# Patient Record
Sex: FEMALE | Race: ASIAN | URBAN - METROPOLITAN AREA
[De-identification: names, ages, dates, MRNs, and addresses within clinical notes are randomized per-mention and may not be internally consistent; named-entity substitution may affect disease eponyms.]

---

## 2021-07-08 ENCOUNTER — OUT OF OFFICE VISIT (OUTPATIENT)
Dept: URBAN - METROPOLITAN AREA MEDICAL CENTER 31 | Facility: MEDICAL CENTER | Age: 61
End: 2021-07-08
Payer: COMMERCIAL

## 2021-07-08 DIAGNOSIS — Z87.11 H/O PEPTIC ULCER: ICD-10-CM

## 2021-07-08 DIAGNOSIS — K31.89 ACQUIRED DEFORMITY OF DUODENUM: ICD-10-CM

## 2021-07-08 DIAGNOSIS — K92.0 BLOODY EMESIS: ICD-10-CM

## 2021-07-08 DIAGNOSIS — K30 ACID INDIGESTION: ICD-10-CM

## 2021-07-08 DIAGNOSIS — T18.2XXA FOREIGN BODY IN STOMACH: ICD-10-CM

## 2021-07-08 DIAGNOSIS — I95.9 HYPOTENSION: ICD-10-CM

## 2021-07-08 DIAGNOSIS — I85.01 BLEEDING ESOPHAGEAL VARICES: ICD-10-CM

## 2021-07-08 PROCEDURE — G8427 DOCREV CUR MEDS BY ELIG CLIN: HCPCS | Performed by: INTERNAL MEDICINE

## 2021-07-08 PROCEDURE — 99222 1ST HOSP IP/OBS MODERATE 55: CPT | Performed by: INTERNAL MEDICINE

## 2021-07-08 PROCEDURE — 43247 EGD REMOVE FOREIGN BODY: CPT | Performed by: INTERNAL MEDICINE

## 2021-07-08 PROCEDURE — 43244 EGD VARICES LIGATION: CPT | Performed by: INTERNAL MEDICINE

## 2021-07-09 ENCOUNTER — OUT OF OFFICE VISIT (OUTPATIENT)
Dept: URBAN - METROPOLITAN AREA MEDICAL CENTER 31 | Facility: MEDICAL CENTER | Age: 61
End: 2021-07-09
Payer: COMMERCIAL

## 2021-07-09 DIAGNOSIS — K92.0 BLOODY EMESIS: ICD-10-CM

## 2021-07-09 DIAGNOSIS — K75.81 CHRONIC STEATOHEPATITIS, NONALCOHOLIC: ICD-10-CM

## 2021-07-09 DIAGNOSIS — K74.69 CIRRHOSIS, CRYPTOGENIC: ICD-10-CM

## 2021-07-09 PROCEDURE — 99232 SBSQ HOSP IP/OBS MODERATE 35: CPT | Performed by: INTERNAL MEDICINE

## 2021-07-10 ENCOUNTER — OUT OF OFFICE VISIT (OUTPATIENT)
Dept: URBAN - METROPOLITAN AREA MEDICAL CENTER 31 | Facility: MEDICAL CENTER | Age: 61
End: 2021-07-10
Payer: COMMERCIAL

## 2021-07-10 DIAGNOSIS — R74.8 ABNORMAL ALKALINE PHOSPHATASE TEST: ICD-10-CM

## 2021-07-10 DIAGNOSIS — K92.0 BLOODY EMESIS: ICD-10-CM

## 2021-07-10 DIAGNOSIS — K75.81 CHRONIC STEATOHEPATITIS, NONALCOHOLIC: ICD-10-CM

## 2021-07-10 DIAGNOSIS — R93.3 ABN FINDINGS-GI TRACT: ICD-10-CM

## 2021-07-10 PROCEDURE — 99232 SBSQ HOSP IP/OBS MODERATE 35: CPT | Performed by: INTERNAL MEDICINE

## 2021-09-01 ENCOUNTER — WEB ENCOUNTER (OUTPATIENT)
Dept: URBAN - METROPOLITAN AREA CLINIC 115 | Facility: CLINIC | Age: 61
End: 2021-09-01

## 2021-09-01 ENCOUNTER — LAB OUTSIDE AN ENCOUNTER (OUTPATIENT)
Dept: URBAN - METROPOLITAN AREA CLINIC 115 | Facility: CLINIC | Age: 61
End: 2021-09-01

## 2021-09-01 ENCOUNTER — OFFICE VISIT (OUTPATIENT)
Dept: URBAN - METROPOLITAN AREA CLINIC 115 | Facility: CLINIC | Age: 61
End: 2021-09-01
Payer: COMMERCIAL

## 2021-09-01 DIAGNOSIS — K74.60 CIRRHOSIS OF LIVER WITHOUT ASCITES, UNSPECIFIED HEPATIC CIRRHOSIS TYPE: ICD-10-CM

## 2021-09-01 DIAGNOSIS — D50.0 IRON DEFICIENCY ANEMIA DUE TO CHRONIC BLOOD LOSS: ICD-10-CM

## 2021-09-01 DIAGNOSIS — R60.0 PEDAL EDEMA: ICD-10-CM

## 2021-09-01 DIAGNOSIS — I85.11 SECONDARY ESOPHAGEAL VARICES WITH BLEEDING: ICD-10-CM

## 2021-09-01 PROBLEM — 12063002: Status: ACTIVE | Noted: 2021-09-01

## 2021-09-01 PROCEDURE — 99214 OFFICE O/P EST MOD 30 MIN: CPT | Performed by: INTERNAL MEDICINE

## 2021-09-01 RX ORDER — FUROSEMIDE 20 MG/1
1 TABLET TABLET ORAL EVERY OTHER DAY
Qty: 15 TABLET | Refills: 3 | OUTPATIENT
Start: 2021-09-01

## 2021-09-01 RX ORDER — VITAMIN A 2400 MCG
1 TABLET CAPSULE ORAL ONCE A DAY
Status: ACTIVE | COMMUNITY

## 2021-09-01 RX ORDER — SPIRONOLACTONE 25 MG/1
1 TABLET TABLET, FILM COATED ORAL EVERY OTHER DAY
Qty: 15 TABLET | Refills: 2 | OUTPATIENT
Start: 2021-09-01 | End: 2021-11-29

## 2021-09-01 RX ORDER — PANTOPRAZOLE SODIUM 40 MG/1
1 TABLET TABLET, DELAYED RELEASE ORAL ONCE A DAY
Qty: 90 TABLET | Refills: 1 | OUTPATIENT
Start: 2021-09-01

## 2021-09-01 RX ORDER — HYDROCHLOROTHIAZIDE 12.5 MG/1
1 CAPSULE IN THE MORNING CAPSULE ORAL ONCE A DAY
Status: ACTIVE | COMMUNITY

## 2021-09-01 RX ORDER — PROPRANOLOL HYDROCHLORIDE 10 MG/1
1 TABLET TABLET ORAL ONCE A DAY
Qty: 30 TABLET | Refills: 2 | OUTPATIENT
Start: 2021-09-01

## 2021-09-01 RX ORDER — PROPRANOLOL HYDROCHLORIDE 10 MG/1
10 MG TABLET ORAL ONCE A DAY
Status: ACTIVE | COMMUNITY

## 2021-09-01 NOTE — HPI-TODAY'S VISIT:
Ms. Paniagua was seen today for follow-up.  She is accompanied by her son and .  She was hospitalized recently at the [facility] for her active hematemesis.  Emergency upper endoscopy revealed gastroesophageal varices and portal gastropathy.  She underwent esophageal banding.  She is not known to have liver disease in the past however recently because of her slightly abnormal liver enzymes as well as her fatigue and tiredness and anemia she was advised to see GI.  Patient was recently diagnosed with diabetes.  No family history of liver disease.  She does not drink alcohol she has not had any medications that would cause her liver disease.  She has not had a prior history of GI bleeding.  She reports having pedal edema for which she has seen cardiologist and was started on furosemide and potassium.  Recent scans were reviewed which showed thickening of the stomach and duodenum however there is no obvious peptic ulcer disease it was noticed during the procedure.  She denies any diarrhea.  She has not had a prior colonoscopy evaluation.  She reports her fatigue and tiredness is improving.

## 2021-09-07 LAB
A/G RATIO: 0.9
AAT, DNA ANALYSIS: (no result)
ADDITIONAL INFORMATION:: (no result)
ALBUMIN: 3.5
ALKALINE PHOSPHATASE: 167
ALT (SGPT): 48
AST (SGOT): 75
BASO (ABSOLUTE): 0.1
BASOS: 1
BILIRUBIN, TOTAL: 2.3
BUN/CREATININE RATIO: 11
BUN: 10
CALCIUM: 9.9
CARBON DIOXIDE, TOTAL: 26
CHLORIDE: 97
CREATININE: 0.95
EGFR IF AFRICN AM: 76
EGFR IF NONAFRICN AM: 66
EOS (ABSOLUTE): 0.2
EOS: 4
FERRITIN, SERUM: 108
GLOBULIN, TOTAL: 3.7
GLUCOSE: 242
HEMATOCRIT: 35.1
HEMATOLOGY COMMENTS:: (no result)
HEMOGLOBIN: 12.2
HEP A AB, TOTAL: POSITIVE
HEP B SURFACE AB, QUAL: REACTIVE
IMMATURE CELLS: (no result)
IMMATURE GRANS (ABS): 0
IMMATURE GRANULOCYTES: 0
LYMPHS (ABSOLUTE): 1.9
LYMPHS: 32
Lab: (no result)
MCH: 32.3
MCHC: 34.8
MCV: 93
MONOCYTES(ABSOLUTE): 0.5
MONOCYTES: 9
NEUTROPHILS (ABSOLUTE): 3.1
NEUTROPHILS: 54
NRBC: (no result)
PLATELETS: 125
POTASSIUM: 3.2
PROTEIN, TOTAL: 7.2
RBC: 3.78
RDW: 14.3
SODIUM: 139
WBC: 5.8

## 2021-09-29 ENCOUNTER — OFFICE VISIT (OUTPATIENT)
Dept: URBAN - METROPOLITAN AREA MEDICAL CENTER 31 | Facility: MEDICAL CENTER | Age: 61
End: 2021-09-29
Payer: COMMERCIAL

## 2021-09-29 DIAGNOSIS — K74.69 CRYPTOGENIC CIRRHOSIS: ICD-10-CM

## 2021-09-29 DIAGNOSIS — I85.10 ESOPH VARICE OTHER DIS: ICD-10-CM

## 2021-09-29 PROCEDURE — 43244 EGD VARICES LIGATION: CPT | Performed by: INTERNAL MEDICINE

## 2022-01-21 ENCOUNTER — WEB ENCOUNTER (OUTPATIENT)
Dept: URBAN - METROPOLITAN AREA CLINIC 115 | Facility: CLINIC | Age: 62
End: 2022-01-21

## 2022-01-21 ENCOUNTER — OFFICE VISIT (OUTPATIENT)
Dept: URBAN - METROPOLITAN AREA CLINIC 115 | Facility: CLINIC | Age: 62
End: 2022-01-21
Payer: COMMERCIAL

## 2022-01-21 ENCOUNTER — LAB OUTSIDE AN ENCOUNTER (OUTPATIENT)
Dept: URBAN - METROPOLITAN AREA CLINIC 115 | Facility: CLINIC | Age: 62
End: 2022-01-21

## 2022-01-21 DIAGNOSIS — I85.11 SECONDARY ESOPHAGEAL VARICES WITH BLEEDING: ICD-10-CM

## 2022-01-21 DIAGNOSIS — K74.60 CIRRHOSIS OF LIVER WITHOUT ASCITES, UNSPECIFIED HEPATIC CIRRHOSIS TYPE: ICD-10-CM

## 2022-01-21 DIAGNOSIS — D50.0 IRON DEFICIENCY ANEMIA DUE TO CHRONIC BLOOD LOSS: ICD-10-CM

## 2022-01-21 DIAGNOSIS — R60.0 PEDAL EDEMA: ICD-10-CM

## 2022-01-21 PROBLEM — 724556004: Status: ACTIVE | Noted: 2021-09-01

## 2022-01-21 PROCEDURE — 99214 OFFICE O/P EST MOD 30 MIN: CPT | Performed by: INTERNAL MEDICINE

## 2022-01-21 RX ORDER — PROPRANOLOL HYDROCHLORIDE 10 MG/1
10 MG TABLET ORAL ONCE A DAY
Status: ACTIVE | COMMUNITY

## 2022-01-21 RX ORDER — PANTOPRAZOLE SODIUM 40 MG/1
1 TABLET TABLET, DELAYED RELEASE ORAL ONCE A DAY
Qty: 90 TABLET | Refills: 1 | Status: ACTIVE | COMMUNITY
Start: 2021-09-01

## 2022-01-21 RX ORDER — PROPRANOLOL HYDROCHLORIDE 10 MG/1
1 TABLET TABLET ORAL ONCE A DAY
Qty: 30 TABLET | Refills: 2 | OUTPATIENT

## 2022-01-21 RX ORDER — FUROSEMIDE 20 MG/1
1 TABLET TABLET ORAL EVERY OTHER DAY
Qty: 15 TABLET | Refills: 3 | Status: ACTIVE | COMMUNITY
Start: 2021-09-01

## 2022-01-21 RX ORDER — HYDROCHLOROTHIAZIDE 12.5 MG/1
1 CAPSULE IN THE MORNING CAPSULE ORAL ONCE A DAY
Status: ACTIVE | COMMUNITY

## 2022-01-21 RX ORDER — VITAMIN A 2400 MCG
1 TABLET CAPSULE ORAL ONCE A DAY
Status: ACTIVE | COMMUNITY

## 2022-01-21 RX ORDER — PROPRANOLOL HYDROCHLORIDE 10 MG/1
1 TABLET TABLET ORAL ONCE A DAY
Qty: 30 TABLET | Refills: 2 | Status: ACTIVE | COMMUNITY
Start: 2021-09-01

## 2022-01-21 RX ORDER — PANTOPRAZOLE SODIUM 40 MG/1
1 TABLET TABLET, DELAYED RELEASE ORAL ONCE A DAY
Qty: 90 TABLET | Refills: 1 | OUTPATIENT

## 2022-01-21 NOTE — HPI-TODAY'S VISIT:
Ms. Paniagua was seen today for follow-up.  She is accompanied by her son and .  She was hospitalized recently at the [facility] for her active hematemesis.  Emergency upper endoscopy revealed gastroesophageal varices and portal gastropathy.  She underwent esophageal banding.  She is not known to have liver disease in the past however recently because of her slightly abnormal liver enzymes as well as her fatigue and tiredness and anemia she was advised to see GI.  Patient was recently diagnosed with diabetes.  No family history of liver disease.  She does not drink alcohol she has not had any medications that would cause her liver disease.  She has not had a prior history of GI bleeding.  She reports having pedal edema for which she has seen cardiologist and was started on furosemide and potassium.  Recent scans were reviewed which showed thickening of the stomach and duodenum however there is no obvious peptic ulcer disease it was noticed during the procedure.  She denies any diarrhea.  She has not had a prior colonoscopy evaluation.  She reports her fatigue and tiredness is improving.  1/21/22:

## 2022-01-21 NOTE — HPI-TODAY'S VISIT:
Ms. Paniagua is here for follow-up.  She is accompanied by her son only complains of some fatigue and tiredness and not able to lose weight otherwise it denies any joint pains or arthritis.  No confusions no reports of recent hematemesis or melena.  Last upper endoscopy was in September that required more banding of the esophageal varices.  She is immune to hep A and hep B.  Work-up in the past few for consistent with a nonalcoholic steatosis.

## 2022-01-27 ENCOUNTER — OFFICE VISIT (OUTPATIENT)
Dept: URBAN - METROPOLITAN AREA CLINIC 114 | Facility: CLINIC | Age: 62
End: 2022-01-27
Payer: COMMERCIAL

## 2022-01-27 DIAGNOSIS — K74.69 OTHER CIRRHOSIS OF LIVER: ICD-10-CM

## 2022-01-27 DIAGNOSIS — R18.8 ASCITES: ICD-10-CM

## 2022-01-27 DIAGNOSIS — K80.20 CHOLELITHIASIS: ICD-10-CM

## 2022-01-27 PROCEDURE — 76705 ECHO EXAM OF ABDOMEN: CPT | Performed by: INTERNAL MEDICINE

## 2022-01-27 RX ORDER — VITAMIN A 2400 MCG
1 TABLET CAPSULE ORAL ONCE A DAY
Status: ACTIVE | COMMUNITY

## 2022-01-27 RX ORDER — PROPRANOLOL HYDROCHLORIDE 10 MG/1
1 TABLET TABLET ORAL ONCE A DAY
Qty: 30 TABLET | Refills: 2 | Status: ACTIVE | COMMUNITY

## 2022-01-27 RX ORDER — PROPRANOLOL HYDROCHLORIDE 10 MG/1
10 MG TABLET ORAL ONCE A DAY
Status: ACTIVE | COMMUNITY

## 2022-01-27 RX ORDER — PANTOPRAZOLE SODIUM 40 MG/1
1 TABLET TABLET, DELAYED RELEASE ORAL ONCE A DAY
Qty: 90 TABLET | Refills: 1 | Status: ACTIVE | COMMUNITY

## 2022-01-27 RX ORDER — FUROSEMIDE 20 MG/1
1 TABLET TABLET ORAL EVERY OTHER DAY
Qty: 15 TABLET | Refills: 3 | Status: ACTIVE | COMMUNITY
Start: 2021-09-01

## 2022-01-27 RX ORDER — HYDROCHLOROTHIAZIDE 12.5 MG/1
1 CAPSULE IN THE MORNING CAPSULE ORAL ONCE A DAY
Status: ACTIVE | COMMUNITY

## 2022-01-28 LAB
A/G RATIO: 0.8
ALBUMIN: 3.2
ALKALINE PHOSPHATASE: 193
ALT (SGPT): 35
AST (SGOT): 63
BASO (ABSOLUTE): 0.1
BASOS: 1
BILIRUBIN, TOTAL: 2.7
BUN/CREATININE RATIO: 8
BUN: 7
CALCIUM: 9.1
CARBON DIOXIDE, TOTAL: 24
CHLORIDE: 104
CREATININE: 0.83
EGFR IF AFRICN AM: 89
EGFR IF NONAFRICN AM: 77
EOS (ABSOLUTE): 0.2
EOS: 5
GLOBULIN, TOTAL: 4
GLUCOSE: 147
HEMATOCRIT: 32.3
HEMATOLOGY COMMENTS:: (no result)
HEMOGLOBIN: 11.2
IMMATURE CELLS: (no result)
IMMATURE GRANS (ABS): 0
IMMATURE GRANULOCYTES: 0
INR: 1.1
LYMPHS (ABSOLUTE): 1.3
LYMPHS: 25
MCH: 32.4
MCHC: 34.7
MCV: 93
MONOCYTES(ABSOLUTE): 0.4
MONOCYTES: 8
NEUTROPHILS (ABSOLUTE): 3
NEUTROPHILS: 61
NRBC: (no result)
PLATELETS: 117
POTASSIUM: 3.7
PROTEIN, TOTAL: 7.2
PROTHROMBIN TIME: 11.9
RBC: 3.46
RDW: 13.1
SODIUM: 142
WBC: 4.9

## 2022-03-16 ENCOUNTER — CLAIMS CREATED FROM THE CLAIM WINDOW (OUTPATIENT)
Dept: URBAN - METROPOLITAN AREA MEDICAL CENTER 31 | Facility: MEDICAL CENTER | Age: 62
End: 2022-03-16

## 2022-03-16 ENCOUNTER — CLAIMS CREATED FROM THE CLAIM WINDOW (OUTPATIENT)
Dept: URBAN - METROPOLITAN AREA MEDICAL CENTER 31 | Facility: MEDICAL CENTER | Age: 62
End: 2022-03-16
Payer: COMMERCIAL

## 2022-03-16 DIAGNOSIS — I85.10 ESOPH VARICE OTHER DIS: ICD-10-CM

## 2022-03-16 DIAGNOSIS — K74.69 CIRRHOSIS, CRYPTOGENIC: ICD-10-CM

## 2022-03-16 PROCEDURE — 43235 EGD DIAGNOSTIC BRUSH WASH: CPT | Performed by: INTERNAL MEDICINE

## 2022-03-16 RX ORDER — VITAMIN A 2400 MCG
1 TABLET CAPSULE ORAL ONCE A DAY
Status: ACTIVE | COMMUNITY

## 2022-03-16 RX ORDER — PROPRANOLOL HYDROCHLORIDE 10 MG/1
1 TABLET TABLET ORAL ONCE A DAY
Qty: 30 TABLET | Refills: 2 | Status: ACTIVE | COMMUNITY

## 2022-03-16 RX ORDER — FUROSEMIDE 20 MG/1
1 TABLET TABLET ORAL EVERY OTHER DAY
Qty: 15 TABLET | Refills: 3 | Status: ACTIVE | COMMUNITY
Start: 2021-09-01

## 2022-03-16 RX ORDER — HYDROCHLOROTHIAZIDE 12.5 MG/1
1 CAPSULE IN THE MORNING CAPSULE ORAL ONCE A DAY
Status: ACTIVE | COMMUNITY

## 2022-03-16 RX ORDER — PROPRANOLOL HYDROCHLORIDE 10 MG/1
10 MG TABLET ORAL ONCE A DAY
Status: ACTIVE | COMMUNITY

## 2022-03-16 RX ORDER — PANTOPRAZOLE SODIUM 40 MG/1
1 TABLET TABLET, DELAYED RELEASE ORAL ONCE A DAY
Qty: 90 TABLET | Refills: 1 | Status: ACTIVE | COMMUNITY

## 2022-04-13 ENCOUNTER — OFFICE VISIT (OUTPATIENT)
Dept: URBAN - METROPOLITAN AREA CLINIC 115 | Facility: CLINIC | Age: 62
End: 2022-04-13
Payer: COMMERCIAL

## 2022-04-13 DIAGNOSIS — K74.60 CIRRHOSIS OF LIVER WITHOUT ASCITES, UNSPECIFIED HEPATIC CIRRHOSIS TYPE: ICD-10-CM

## 2022-04-13 DIAGNOSIS — I85.11 SECONDARY ESOPHAGEAL VARICES WITH BLEEDING: ICD-10-CM

## 2022-04-13 DIAGNOSIS — R60.0 PEDAL EDEMA: ICD-10-CM

## 2022-04-13 PROCEDURE — 99214 OFFICE O/P EST MOD 30 MIN: CPT | Performed by: INTERNAL MEDICINE

## 2022-04-13 RX ORDER — LACTULOSE SOLUTION USP, 10 G/15 ML 10 G/15ML
15 ML SOLUTION ORAL; RECTAL ONCE A DAY
Qty: 450 ML | Refills: 4 | OUTPATIENT
Start: 2022-04-13 | End: 2022-09-10

## 2022-04-13 RX ORDER — PROPRANOLOL HYDROCHLORIDE 10 MG/1
1 TABLET TABLET ORAL ONCE A DAY
Qty: 30 TABLET | Refills: 2 | Status: ACTIVE | COMMUNITY

## 2022-04-13 RX ORDER — FUROSEMIDE 20 MG/1
1 TABLET TABLET ORAL EVERY OTHER DAY
Qty: 15 TABLET | Refills: 3 | Status: ACTIVE | COMMUNITY
Start: 2021-09-01

## 2022-04-13 RX ORDER — HYDROCHLOROTHIAZIDE 12.5 MG/1
1 CAPSULE IN THE MORNING CAPSULE ORAL ONCE A DAY
Status: ACTIVE | COMMUNITY

## 2022-04-13 RX ORDER — PROPRANOLOL HYDROCHLORIDE 10 MG/1
1 TABLET TABLET ORAL ONCE A DAY
Qty: 30 TABLET | Refills: 2 | OUTPATIENT

## 2022-04-13 RX ORDER — PROPRANOLOL HYDROCHLORIDE 10 MG/1
10 MG TABLET ORAL ONCE A DAY
Status: ACTIVE | COMMUNITY

## 2022-04-13 RX ORDER — FUROSEMIDE 20 MG/1
1 TABLET TABLET ORAL ONCE A DAY
Qty: 30 TABLET | Refills: 4 | OUTPATIENT
Start: 2022-04-13

## 2022-04-13 RX ORDER — PANTOPRAZOLE SODIUM 40 MG/1
1 TABLET TABLET, DELAYED RELEASE ORAL ONCE A DAY
Qty: 90 TABLET | Refills: 1 | Status: ACTIVE | COMMUNITY

## 2022-04-13 RX ORDER — VITAMIN A 2400 MCG
1 TABLET CAPSULE ORAL ONCE A DAY
Status: ACTIVE | COMMUNITY

## 2022-04-13 RX ORDER — SPIRONOLACTONE 25 MG/1
1 TABLET TABLET, FILM COATED ORAL ONCE A DAY
Qty: 30 TABLET | Refills: 5 | OUTPATIENT
Start: 2022-04-13 | End: 2022-10-10

## 2022-04-13 RX ORDER — PANTOPRAZOLE SODIUM 40 MG/1
1 TABLET TABLET, DELAYED RELEASE ORAL ONCE A DAY
Qty: 90 TABLET | Refills: 1 | OUTPATIENT

## 2022-04-13 NOTE — HPI-TODAY'S VISIT:
Ms. Paniagua was seen today for follow-up.  She is accompanied by her son and .  She was hospitalized recently at the [facility] for her active hematemesis.  Emergency upper endoscopy revealed gastroesophageal varices and portal gastropathy.  She underwent esophageal banding.  She is not known to have liver disease in the past however recently because of her slightly abnormal liver enzymes as well as her fatigue and tiredness and anemia she was advised to see GI.  Patient was recently diagnosed with diabetes.  No family history of liver disease.  She does not drink alcohol she has not had any medications that would cause her liver disease.  She has not had a prior history of GI bleeding.  She reports having pedal edema for which she has seen cardiologist and was started on furosemide and potassium.  Recent scans were reviewed which showed thickening of the stomach and duodenum however there is no obvious peptic ulcer disease it was noticed during the procedure.  She denies any diarrhea.  She has not had a prior colonoscopy evaluation.  She reports her fatigue and tiredness is improving.  1/21/22:

## 2022-04-13 NOTE — HPI-TODAY'S VISIT:
Ms. Paniagua is here for follow-up.  She is accompanied by her son only complains of some fatigue and tiredness and not able to lose weight otherwise it denies any joint pains or arthritis.  No confusions no reports of recent hematemesis or melena.  Last upper endoscopy was in September that required more banding of the esophageal varices.  She is immune to hep A and hep B.  Work-up in the past few for consistent with a nonalcoholic steatosis.  4/13/22 : Ms. Paniagua was seen today for a follow-up along with her son and .  Patient denies any complaints except for abdominal pain and discomfort when she takes iron supplements.  After she had an upper GI bleed last year she was recommended requested to start on iron and she stayed on iron most recent hemoglobin within normal limits hematocrit around 32 she reports having abdominal bloating and distention after she taking iron and also she is on torsemide and potassium pills but that she was difficult to swallow patient reports pedal edema otherwise denies any other complaints.  Denies any chest pain shortness of breath.

## 2022-04-14 LAB
A/G RATIO: 0.7
ALBUMIN: 3.3
ALKALINE PHOSPHATASE: 195
ALT (SGPT): 38
AST (SGOT): 63
BASO (ABSOLUTE): 0.1
BASOS: 2
BILIRUBIN, TOTAL: 2.7
BUN/CREATININE RATIO: 8
BUN: 7
CALCIUM: 9.2
CARBON DIOXIDE, TOTAL: 27
CHLORIDE: 103
CREATININE: 0.86
EGFR: 77
EOS (ABSOLUTE): 0.3
EOS: 6
GLOBULIN, TOTAL: 4.5
GLUCOSE: 147
HEMATOCRIT: 33.3
HEMATOLOGY COMMENTS:: (no result)
HEMOGLOBIN: 11.2
IMMATURE CELLS: (no result)
IMMATURE GRANS (ABS): 0
IMMATURE GRANULOCYTES: 0
INR: 1.1
LYMPHS (ABSOLUTE): 1.3
LYMPHS: 27
MCH: 32.3
MCHC: 33.6
MCV: 96
MONOCYTES(ABSOLUTE): 0.4
MONOCYTES: 9
NEUTROPHILS (ABSOLUTE): 2.9
NEUTROPHILS: 56
NRBC: (no result)
PLATELETS: 133
POTASSIUM: 4.2
PROTEIN, TOTAL: 7.8
PROTHROMBIN TIME: 11.6
RBC: 3.47
RDW: 13.4
SODIUM: 141
WBC: 5

## 2022-05-25 ENCOUNTER — LAB OUTSIDE AN ENCOUNTER (OUTPATIENT)
Dept: URBAN - METROPOLITAN AREA CLINIC 115 | Facility: CLINIC | Age: 62
End: 2022-05-25

## 2022-07-20 ENCOUNTER — OFFICE VISIT (OUTPATIENT)
Dept: URBAN - METROPOLITAN AREA CLINIC 115 | Facility: CLINIC | Age: 62
End: 2022-07-20
Payer: COMMERCIAL

## 2022-07-20 ENCOUNTER — LAB OUTSIDE AN ENCOUNTER (OUTPATIENT)
Dept: URBAN - METROPOLITAN AREA CLINIC 115 | Facility: CLINIC | Age: 62
End: 2022-07-20

## 2022-07-20 VITALS
HEIGHT: 63 IN | TEMPERATURE: 97.5 F | DIASTOLIC BLOOD PRESSURE: 63 MMHG | HEART RATE: 73 BPM | BODY MASS INDEX: 21.44 KG/M2 | WEIGHT: 121 LBS | SYSTOLIC BLOOD PRESSURE: 100 MMHG

## 2022-07-20 DIAGNOSIS — I85.11 SECONDARY ESOPHAGEAL VARICES WITH BLEEDING: ICD-10-CM

## 2022-07-20 DIAGNOSIS — K74.60 CIRRHOSIS OF LIVER WITHOUT ASCITES, UNSPECIFIED HEPATIC CIRRHOSIS TYPE: ICD-10-CM

## 2022-07-20 DIAGNOSIS — R60.0 PEDAL EDEMA: ICD-10-CM

## 2022-07-20 PROCEDURE — 99214 OFFICE O/P EST MOD 30 MIN: CPT | Performed by: INTERNAL MEDICINE

## 2022-07-20 RX ORDER — VITAMIN A 2400 MCG
1 TABLET CAPSULE ORAL ONCE A DAY
Status: ACTIVE | COMMUNITY

## 2022-07-20 RX ORDER — PANTOPRAZOLE SODIUM 20 MG/1
1 TABLET TABLET, DELAYED RELEASE ORAL ONCE A DAY
Qty: 90 TABLET | Refills: 1 | OUTPATIENT

## 2022-07-20 RX ORDER — PROPRANOLOL HYDROCHLORIDE 10 MG/1
1 TABLET TABLET ORAL ONCE A DAY
Qty: 30 TABLET | Refills: 2 | OUTPATIENT

## 2022-07-20 RX ORDER — LACTULOSE SOLUTION USP, 10 G/15 ML 10 G/15ML
15 ML SOLUTION ORAL; RECTAL ONCE A DAY
Qty: 450 ML | Refills: 4 | OUTPATIENT

## 2022-07-20 RX ORDER — FUROSEMIDE 20 MG/1
1 TABLET TABLET ORAL ONCE A DAY
Qty: 30 TABLET | Refills: 4 | Status: ACTIVE | COMMUNITY
Start: 2022-04-13

## 2022-07-20 RX ORDER — HYDROCHLOROTHIAZIDE 12.5 MG/1
1 CAPSULE IN THE MORNING CAPSULE ORAL ONCE A DAY
Status: ACTIVE | COMMUNITY

## 2022-07-20 RX ORDER — PROPRANOLOL HYDROCHLORIDE 10 MG/1
10 MG TABLET ORAL ONCE A DAY
Status: ACTIVE | COMMUNITY

## 2022-07-20 RX ORDER — FUROSEMIDE 20 MG/1
1 TABLET TABLET ORAL ONCE A DAY
Qty: 30 TABLET | Refills: 4 | OUTPATIENT

## 2022-07-20 RX ORDER — SPIRONOLACTONE 25 MG/1
1 TABLET TABLET, FILM COATED ORAL ONCE A DAY
Qty: 30 TABLET | Refills: 5 | Status: ACTIVE | COMMUNITY
Start: 2022-04-13 | End: 2022-10-10

## 2022-07-20 RX ORDER — SPIRONOLACTONE 25 MG/1
1 TABLET TABLET, FILM COATED ORAL ONCE A DAY
Qty: 30 TABLET | Refills: 5 | OUTPATIENT

## 2022-07-20 RX ORDER — PANTOPRAZOLE SODIUM 40 MG/1
1 TABLET TABLET, DELAYED RELEASE ORAL ONCE A DAY
Qty: 90 TABLET | Refills: 1 | Status: ACTIVE | COMMUNITY

## 2022-07-20 RX ORDER — LACTULOSE SOLUTION USP, 10 G/15 ML 10 G/15ML
15 ML SOLUTION ORAL; RECTAL ONCE A DAY
Qty: 450 ML | Refills: 4 | Status: ACTIVE | COMMUNITY
Start: 2022-04-13 | End: 2022-09-10

## 2022-07-20 RX ORDER — PROPRANOLOL HYDROCHLORIDE 10 MG/1
1 TABLET TABLET ORAL ONCE A DAY
Qty: 30 TABLET | Refills: 2 | Status: ACTIVE | COMMUNITY

## 2022-07-20 RX ORDER — FUROSEMIDE 20 MG/1
1 TABLET TABLET ORAL EVERY OTHER DAY
Qty: 15 TABLET | Refills: 3 | Status: ACTIVE | COMMUNITY
Start: 2021-09-01

## 2022-07-20 NOTE — HPI-TODAY'S VISIT:
Ms. Paniagua was seen today for follow-up.  She is accompanied by her son and .  She was hospitalized recently at the [facility] for her active hematemesis.  Emergency upper endoscopy revealed gastroesophageal varices and portal gastropathy.  She underwent esophageal banding.  She is not known to have liver disease in the past however recently because of her slightly abnormal liver enzymes as well as her fatigue and tiredness and anemia she was advised to see GI.  Patient was recently diagnosed with diabetes.  No family history of liver disease.  She does not drink alcohol she has not had any medications that would cause her liver disease.  She has not had a prior history of GI bleeding.  She reports having pedal edema for which she has seen cardiologist and was started on furosemide and potassium.  Recent scans were reviewed which showed thickening of the stomach and duodenum however there is no obvious peptic ulcer disease it was noticed during the procedure.  She denies any diarrhea.  She has not had a prior colonoscopy evaluation.  She reports her fatigue and tiredness is improving.  1/21/22:

## 2022-07-20 NOTE — HPI-TODAY'S VISIT:
Ms. Paniagua is here for follow-up.  She is accompanied by her son only complains of some fatigue and tiredness and not able to lose weight otherwise it denies any joint pains or arthritis.  No confusions no reports of recent hematemesis or melena.  Last upper endoscopy was in September that required more banding of the esophageal varices.  She is immune to hep A and hep B.  Work-up in the past few for consistent with a nonalcoholic steatosis.  4/13/22 : Ms. Paniagua was seen today for a follow-up along with her son and .  Patient denies any complaints except for abdominal pain and discomfort when she takes iron supplements.  After she had an upper GI bleed last year she was recommended requested to start on iron and she stayed on iron most recent hemoglobin within normal limits hematocrit around 32 she reports having abdominal bloating and distention after she taking iron and also she is on torsemide and potassium pills but that she was difficult to swallow patient reports pedal edema otherwise denies any other complaints.  Denies any chest pain shortness of breath.  7/20/22 : -Was seen today for follow-up accompanied by her son.  Denies any new complaints abdominal bloating and distention has been much better but she continues to have intermittent fatigue diagnosed.  Has been compliant taking medications.  Patient reports stable diabetes.  Prior labs and work-up have been reviewed with the patient and her son.

## 2022-07-21 LAB
A/G RATIO: 0.8
ABSOLUTE BASOPHILS: 52
ABSOLUTE EOSINOPHILS: 222
ABSOLUTE LYMPHOCYTES: 999
ABSOLUTE MONOCYTES: 385
ABSOLUTE NEUTROPHILS: 2042
ALBUMIN: 3.1
ALKALINE PHOSPHATASE: 134
ALT (SGPT): 36
AST (SGOT): 55
BASOPHILS: 1.4
BILIRUBIN, TOTAL: 2.5
BUN/CREATININE RATIO: (no result)
BUN: 14
CALCIUM: 9.2
CARBON DIOXIDE, TOTAL: 24
CHLORIDE: 107
CREATININE: 0.74
EGFR: 93
EOSINOPHILS: 6
GLOBULIN, TOTAL: 4.1
GLUCOSE: 159
HEMATOCRIT: 30.1
HEMOGLOBIN: 10.1
INR: 1.1
LYMPHOCYTES: 27
MCH: 30.2
MCHC: 33.6
MCV: 90.1
MONOCYTES: 10.4
MPV: 11
NEUTROPHILS: 55.2
PLATELET COUNT: 95
POTASSIUM: 4.2
PROTEIN, TOTAL: 7.2
PT: 11.8
RDW: 13.1
RED BLOOD CELL COUNT: 3.34
SODIUM: 139
WHITE BLOOD CELL COUNT: 3.7

## 2022-07-28 ENCOUNTER — CLAIMS CREATED FROM THE CLAIM WINDOW (OUTPATIENT)
Dept: URBAN - METROPOLITAN AREA CLINIC 114 | Facility: CLINIC | Age: 62
End: 2022-07-28
Payer: COMMERCIAL

## 2022-07-28 ENCOUNTER — OFFICE VISIT (OUTPATIENT)
Dept: URBAN - METROPOLITAN AREA CLINIC 114 | Facility: CLINIC | Age: 62
End: 2022-07-28

## 2022-07-28 ENCOUNTER — CLAIMS CREATED FROM THE CLAIM WINDOW (OUTPATIENT)
Dept: URBAN - METROPOLITAN AREA CLINIC 114 | Facility: CLINIC | Age: 62
End: 2022-07-28

## 2022-07-28 DIAGNOSIS — K80.20 CHOLELITHIASIS: ICD-10-CM

## 2022-07-28 DIAGNOSIS — K74.69 OTHER CIRRHOSIS OF LIVER: ICD-10-CM

## 2022-07-28 PROCEDURE — 76705 ECHO EXAM OF ABDOMEN: CPT | Performed by: INTERNAL MEDICINE

## 2022-07-28 RX ORDER — FUROSEMIDE 20 MG/1
1 TABLET TABLET ORAL EVERY OTHER DAY
Qty: 15 TABLET | Refills: 3 | Status: ACTIVE | COMMUNITY
Start: 2021-09-01

## 2022-07-28 RX ORDER — HYDROCHLOROTHIAZIDE 12.5 MG/1
1 CAPSULE IN THE MORNING CAPSULE ORAL ONCE A DAY
Status: ACTIVE | COMMUNITY

## 2022-07-28 RX ORDER — PANTOPRAZOLE SODIUM 20 MG/1
1 TABLET TABLET, DELAYED RELEASE ORAL ONCE A DAY
Qty: 90 TABLET | Refills: 1 | Status: ACTIVE | COMMUNITY

## 2022-07-28 RX ORDER — SPIRONOLACTONE 25 MG/1
1 TABLET TABLET, FILM COATED ORAL ONCE A DAY
Qty: 30 TABLET | Refills: 5 | Status: ACTIVE | COMMUNITY

## 2022-07-28 RX ORDER — PROPRANOLOL HYDROCHLORIDE 10 MG/1
1 TABLET TABLET ORAL ONCE A DAY
Qty: 30 TABLET | Refills: 2 | Status: ACTIVE | COMMUNITY

## 2022-07-28 RX ORDER — LACTULOSE SOLUTION USP, 10 G/15 ML 10 G/15ML
15 ML SOLUTION ORAL; RECTAL ONCE A DAY
Qty: 450 ML | Refills: 4 | Status: ACTIVE | COMMUNITY

## 2022-07-28 RX ORDER — PROPRANOLOL HYDROCHLORIDE 10 MG/1
10 MG TABLET ORAL ONCE A DAY
Status: ACTIVE | COMMUNITY

## 2022-07-28 RX ORDER — VITAMIN A 2400 MCG
1 TABLET CAPSULE ORAL ONCE A DAY
Status: ACTIVE | COMMUNITY

## 2022-07-28 RX ORDER — FUROSEMIDE 20 MG/1
1 TABLET TABLET ORAL ONCE A DAY
Qty: 30 TABLET | Refills: 4 | Status: ACTIVE | COMMUNITY

## 2022-08-16 PROBLEM — 19943007 CIRRHOSIS OF LIVER: Status: ACTIVE | Noted: 2022-08-16

## 2023-01-27 ENCOUNTER — LAB OUTSIDE AN ENCOUNTER (OUTPATIENT)
Dept: URBAN - METROPOLITAN AREA CLINIC 115 | Facility: CLINIC | Age: 63
End: 2023-01-27

## 2023-01-27 ENCOUNTER — CLAIMS CREATED FROM THE CLAIM WINDOW (OUTPATIENT)
Dept: URBAN - METROPOLITAN AREA CLINIC 115 | Facility: CLINIC | Age: 63
End: 2023-01-27
Payer: COMMERCIAL

## 2023-01-27 VITALS
HEIGHT: 63 IN | BODY MASS INDEX: 22.01 KG/M2 | TEMPERATURE: 97.8 F | HEART RATE: 67 BPM | SYSTOLIC BLOOD PRESSURE: 110 MMHG | WEIGHT: 124.2 LBS | DIASTOLIC BLOOD PRESSURE: 63 MMHG

## 2023-01-27 DIAGNOSIS — R60.0 PEDAL EDEMA: ICD-10-CM

## 2023-01-27 DIAGNOSIS — I85.11 SECONDARY ESOPHAGEAL VARICES WITH BLEEDING: ICD-10-CM

## 2023-01-27 DIAGNOSIS — K74.60 CIRRHOSIS OF LIVER WITHOUT ASCITES, UNSPECIFIED HEPATIC CIRRHOSIS TYPE: ICD-10-CM

## 2023-01-27 PROCEDURE — 99214 OFFICE O/P EST MOD 30 MIN: CPT | Performed by: INTERNAL MEDICINE

## 2023-01-27 RX ORDER — HYDROCHLOROTHIAZIDE 12.5 MG/1
1 CAPSULE IN THE MORNING CAPSULE ORAL ONCE A DAY
Status: DISCONTINUED | COMMUNITY

## 2023-01-27 RX ORDER — LACTULOSE SOLUTION USP, 10 G/15 ML 10 G/15ML
15 ML SOLUTION ORAL; RECTAL ONCE A DAY
Qty: 450 ML | Refills: 4 | Status: ACTIVE | COMMUNITY

## 2023-01-27 RX ORDER — PANTOPRAZOLE SODIUM 40 MG/1
1 TABLET TABLET, DELAYED RELEASE ORAL ONCE A DAY
Qty: 90 TABLET | Refills: 1 | Status: ACTIVE | COMMUNITY

## 2023-01-27 RX ORDER — PROPRANOLOL HYDROCHLORIDE 10 MG/1
1 TABLET TABLET ORAL ONCE A DAY
Qty: 90 TABLET | Refills: 2 | OUTPATIENT

## 2023-01-27 RX ORDER — PANTOPRAZOLE SODIUM 20 MG/1
1 TABLET TABLET, DELAYED RELEASE ORAL ONCE A DAY
Qty: 90 TABLET | Refills: 1 | OUTPATIENT

## 2023-01-27 RX ORDER — SPIRONOLACTONE 25 MG/1
1 TABLET TABLET, FILM COATED ORAL ONCE A DAY
Qty: 30 TABLET | Refills: 5 | Status: ACTIVE | COMMUNITY

## 2023-01-27 RX ORDER — CYPROHEPTADINE HYDROCHLORIDE 4 MG/1
1 TABLET TABLET ORAL TWICE A DAY
Status: ACTIVE | COMMUNITY

## 2023-01-27 RX ORDER — SPIRONOLACTONE 25 MG/1
1 TABLET TABLET, FILM COATED ORAL ONCE A DAY
Qty: 30 TABLET | Refills: 5 | OUTPATIENT

## 2023-01-27 RX ORDER — LACTULOSE 10 G/15ML
15 ML SOLUTION ORAL ONCE A DAY
Status: ACTIVE | COMMUNITY

## 2023-01-27 RX ORDER — FUROSEMIDE 20 MG/1
1 TABLET TABLET ORAL EVERY OTHER DAY
Qty: 15 TABLET | Refills: 3 | Status: ACTIVE | COMMUNITY
Start: 2021-09-01

## 2023-01-27 RX ORDER — VITAMIN A 2400 MCG
1 TABLET CAPSULE ORAL ONCE A DAY
Status: ACTIVE | COMMUNITY

## 2023-01-27 RX ORDER — PROPRANOLOL HYDROCHLORIDE 10 MG/1
10 MG TABLET ORAL ONCE A DAY
Status: ACTIVE | COMMUNITY

## 2023-01-27 RX ORDER — PROPRANOLOL HYDROCHLORIDE 10 MG/1
1 TABLET TABLET ORAL ONCE A DAY
Qty: 30 TABLET | Refills: 2 | Status: DISCONTINUED | COMMUNITY

## 2023-01-27 RX ORDER — TORSEMIDE 20 MG/1
AS DIRECTED TABLET ORAL
Status: ACTIVE | COMMUNITY

## 2023-01-27 RX ORDER — FUROSEMIDE 20 MG/1
1 TABLET TABLET ORAL EVERY OTHER DAY
OUTPATIENT
Start: 2021-09-01

## 2023-01-27 RX ORDER — LACTULOSE SOLUTION USP, 10 G/15 ML 10 G/15ML
15 ML SOLUTION ORAL; RECTAL ONCE A DAY
Qty: 450 ML | Refills: 4 | OUTPATIENT

## 2023-01-27 RX ORDER — FUROSEMIDE 20 MG/1
1 TABLET TABLET ORAL ONCE A DAY
Qty: 30 TABLET | Refills: 4 | Status: DISCONTINUED | COMMUNITY

## 2023-01-27 NOTE — HPI-TODAY'S VISIT:
Ms. Paniagua is here for follow-up.  She is accompanied by her son only complains of some fatigue and tiredness and not able to lose weight otherwise it denies any joint pains or arthritis.  No confusions no reports of recent hematemesis or melena.  Last upper endoscopy was in September that required more banding of the esophageal varices.  She is immune to hep A and hep B.  Work-up in the past few for consistent with a nonalcoholic steatosis.  4/13/22 : Ms. Paniagua was seen today for a follow-up along with her son and .  Patient denies any complaints except for abdominal pain and discomfort when she takes iron supplements.  After she had an upper GI bleed last year she was recommended requested to start on iron and she stayed on iron most recent hemoglobin within normal limits hematocrit around 32 she reports having abdominal bloating and distention after she taking iron and also she is on torsemide and potassium pills but that she was difficult to swallow patient reports pedal edema otherwise denies any other complaints.  Denies any chest pain shortness of breath.  7/20/22 : -Was seen today for follow-up accompanied by her son.  Denies any new complaints abdominal bloating and distention has been much better but she continues to have intermittent fatigue diagnosed.  Has been compliant taking medications.  Patient reports stable diabetes.  Prior labs and work-up have been reviewed with the patient and her son.  1/27/23 : Ms. Paniagua was seen today for follow-up she is accompanied by her son.  No new complaints.  6 months ago her PCP has given her cyproheptadine to use as needed for appetite stimulant.  Patient stated she only uses probably once a month.  She has been able to eat her appetite has improved.  During last visit I advised him to discontinue torsemide and continue furosemide and Aldactone it seems that they are been using it both torsemide and 20 mg of furosemide.  But  patient ran out of furosemide more than 3 to 4 weeks ago.  No reports of weight gain no reports of worsening of pedal edema.  She has cryptogenic cirrhosis complicated with esophageal variceal GI bleed.  Last upper endoscopy was March 2022 and she is due for 1 prior upper right upper quadrant ultrasound was also reviewed with him and she is due for them as well.

## 2023-01-27 NOTE — HPI-TODAY'S VISIT:
Ms. Paniagua was seen today for follow-up.  She is accompanied by her son and .  She was hospitalized recently at the [facility] for her active hematemesis.  Emergency upper endoscopy revealed gastroesophageal varices and portal gastropathy.  She underwent esophageal banding.  She is not known to have liver disease in the past however recently because of her slightly abnormal liver enzymes as well as her fatigue and tiredness and anemia she was advised to see GI.  Patient was recently diagnosed with diabetes.  No family history of liver disease.  She does not drink alcohol she has not had any medications that would cause her liver disease.  She has not had a prior history of GI bleeding.  She reports having pedal edema for which she has seen cardiologist and was started on furosemide and potassium.  Recent scans were reviewed which showed thickening of the stomach and duodenum however there is no obvious peptic ulcer disease it was noticed during the procedure.  She denies any diarrhea.  She has not had a prior colonoscopy evaluation.  She reports her fatigue and tiredness is improving.  1/21/22:

## 2023-01-28 LAB
A/G RATIO: 0.7
ABSOLUTE BASOPHILS: 48
ABSOLUTE EOSINOPHILS: 352
ABSOLUTE LYMPHOCYTES: 1333
ABSOLUTE MONOCYTES: 383
ABSOLUTE NEUTROPHILS: 2284
ALBUMIN: 3
ALKALINE PHOSPHATASE: 168
ALT (SGPT): 42
AST (SGOT): 62
BASOPHILS: 1.1
BILIRUBIN, TOTAL: 2.3
BUN/CREATININE RATIO: (no result)
BUN: 11
CALCIUM: 9.3
CARBON DIOXIDE, TOTAL: 28
CHLORIDE: 105
CREATININE: 0.69
EGFR: 99
EOSINOPHILS: 8
GLOBULIN, TOTAL: 4.2
GLUCOSE: 177
HEMATOCRIT: 31.5
HEMOGLOBIN: 10.8
INR: 1.1
LYMPHOCYTES: 30.3
MCH: 30.7
MCHC: 34.3
MCV: 89.5
MONOCYTES: 8.7
MPV: 11.4
NEUTROPHILS: 51.9
PLATELET COUNT: 111
POTASSIUM: 3.9
PROTEIN, TOTAL: 7.2
PT: 11.4
RDW: 13.9
RED BLOOD CELL COUNT: 3.52
SODIUM: 139
WHITE BLOOD CELL COUNT: 4.4

## 2023-01-31 PROBLEM — 19943007: Status: ACTIVE | Noted: 2021-09-01

## 2023-01-31 PROBLEM — 17709002: Status: ACTIVE | Noted: 2021-09-01

## 2023-02-02 ENCOUNTER — OFFICE VISIT (OUTPATIENT)
Dept: URBAN - METROPOLITAN AREA CLINIC 114 | Facility: CLINIC | Age: 63
End: 2023-02-02
Payer: COMMERCIAL

## 2023-02-02 DIAGNOSIS — K74.60 CIRRHOSIS: ICD-10-CM

## 2023-02-02 DIAGNOSIS — K80.20 CHOLELITHIASIS: ICD-10-CM

## 2023-02-02 PROCEDURE — 76705 ECHO EXAM OF ABDOMEN: CPT | Performed by: INTERNAL MEDICINE

## 2023-02-02 RX ORDER — PROPRANOLOL HYDROCHLORIDE 10 MG/1
1 TABLET TABLET ORAL ONCE A DAY
Qty: 90 TABLET | Refills: 2 | Status: ACTIVE | COMMUNITY

## 2023-02-02 RX ORDER — VITAMIN A 2400 MCG
1 TABLET CAPSULE ORAL ONCE A DAY
Status: ACTIVE | COMMUNITY

## 2023-02-02 RX ORDER — LACTULOSE SOLUTION USP, 10 G/15 ML 10 G/15ML
15 ML SOLUTION ORAL; RECTAL ONCE A DAY
Qty: 450 ML | Refills: 4 | Status: ACTIVE | COMMUNITY

## 2023-02-02 RX ORDER — TORSEMIDE 20 MG/1
AS DIRECTED TABLET ORAL
Status: ACTIVE | COMMUNITY

## 2023-02-02 RX ORDER — PROPRANOLOL HYDROCHLORIDE 10 MG/1
10 MG TABLET ORAL ONCE A DAY
Status: ACTIVE | COMMUNITY

## 2023-02-02 RX ORDER — CYPROHEPTADINE HYDROCHLORIDE 4 MG/1
1 TABLET TABLET ORAL TWICE A DAY
Status: ACTIVE | COMMUNITY

## 2023-02-02 RX ORDER — PANTOPRAZOLE SODIUM 20 MG/1
1 TABLET TABLET, DELAYED RELEASE ORAL ONCE A DAY
Qty: 90 TABLET | Refills: 1 | Status: ACTIVE | COMMUNITY

## 2023-02-02 RX ORDER — LACTULOSE 10 G/15ML
15 ML SOLUTION ORAL ONCE A DAY
Status: ACTIVE | COMMUNITY

## 2023-02-02 RX ORDER — SPIRONOLACTONE 25 MG/1
1 TABLET TABLET, FILM COATED ORAL ONCE A DAY
Qty: 30 TABLET | Refills: 5 | Status: ACTIVE | COMMUNITY

## 2023-02-06 PROBLEM — 266474003 CHOLELITHIASIS: Status: ACTIVE | Noted: 2022-08-16

## 2023-02-06 PROBLEM — 255417007 CIRRHOTIC: Status: ACTIVE | Noted: 2023-02-06

## 2023-02-08 ENCOUNTER — OFFICE VISIT (OUTPATIENT)
Dept: URBAN - METROPOLITAN AREA MEDICAL CENTER 31 | Facility: MEDICAL CENTER | Age: 63
End: 2023-02-08
Payer: COMMERCIAL

## 2023-02-08 DIAGNOSIS — K74.69 CIRRHOSIS, CRYPTOGENIC: ICD-10-CM

## 2023-02-08 DIAGNOSIS — I85.10 ESOPH VARICE OTHER DIS: ICD-10-CM

## 2023-02-08 PROCEDURE — 43244 EGD VARICES LIGATION: CPT | Performed by: INTERNAL MEDICINE

## 2023-05-10 ENCOUNTER — OFFICE VISIT (OUTPATIENT)
Dept: URBAN - METROPOLITAN AREA CLINIC 115 | Facility: CLINIC | Age: 63
End: 2023-05-10
Payer: COMMERCIAL

## 2023-05-10 VITALS
BODY MASS INDEX: 21.09 KG/M2 | WEIGHT: 119 LBS | TEMPERATURE: 98 F | HEIGHT: 63 IN | HEART RATE: 100 BPM | SYSTOLIC BLOOD PRESSURE: 118 MMHG | DIASTOLIC BLOOD PRESSURE: 70 MMHG

## 2023-05-10 DIAGNOSIS — K52.9 GASTROENTERITIS: ICD-10-CM

## 2023-05-10 DIAGNOSIS — K80.20 CALCULUS OF GALLBLADDER WITHOUT CHOLECYSTITIS WITHOUT OBSTRUCTION: ICD-10-CM

## 2023-05-10 DIAGNOSIS — I85.11 SECONDARY ESOPHAGEAL VARICES WITH BLEEDING: ICD-10-CM

## 2023-05-10 DIAGNOSIS — K74.60 UNSPECIFIED CIRRHOSIS OF LIVER: ICD-10-CM

## 2023-05-10 PROBLEM — 389026000: Status: ACTIVE | Noted: 2023-05-10

## 2023-05-10 PROBLEM — 162031009: Status: ACTIVE | Noted: 2023-05-10

## 2023-05-10 PROBLEM — 70342003: Status: ACTIVE | Noted: 2023-05-10

## 2023-05-10 PROBLEM — 19943007: Status: ACTIVE | Noted: 2023-05-10

## 2023-05-10 PROBLEM — 25374005: Status: ACTIVE | Noted: 2023-05-10

## 2023-05-10 PROCEDURE — 99214 OFFICE O/P EST MOD 30 MIN: CPT | Performed by: INTERNAL MEDICINE

## 2023-05-10 RX ORDER — LACTULOSE SOLUTION USP, 10 G/15 ML 10 G/15ML
15 ML SOLUTION ORAL; RECTAL ONCE A DAY
Qty: 450 ML | Refills: 4 | Status: DISCONTINUED | COMMUNITY

## 2023-05-10 RX ORDER — VITAMIN A 2400 MCG
1 TABLET CAPSULE ORAL ONCE A DAY
Status: DISCONTINUED | COMMUNITY

## 2023-05-10 RX ORDER — ONDANSETRON 8 MG/1
1 TABLET ON THE TONGUE AND ALLOW TO DISSOLVE AS NEEDED TABLET, ORALLY DISINTEGRATING ORAL
Qty: 20 TABLET | Refills: 0 | OUTPATIENT
Start: 2023-05-10

## 2023-05-10 RX ORDER — PROPRANOLOL HYDROCHLORIDE 10 MG/1
1 TABLET TABLET ORAL ONCE A DAY
Qty: 90 TABLET | Refills: 2 | Status: ACTIVE | COMMUNITY

## 2023-05-10 RX ORDER — CYPROHEPTADINE HYDROCHLORIDE 4 MG/1
1 TABLET TABLET ORAL TWICE A DAY
Status: DISCONTINUED | COMMUNITY

## 2023-05-10 RX ORDER — PANTOPRAZOLE SODIUM 40 MG/1
1 TABLET TABLET, DELAYED RELEASE ORAL ONCE A DAY
Qty: 90 TABLET | Refills: 1 | Status: ACTIVE | COMMUNITY

## 2023-05-10 RX ORDER — TORSEMIDE 20 MG/1
AS DIRECTED TABLET ORAL
Status: ACTIVE | COMMUNITY

## 2023-05-10 RX ORDER — LACTULOSE 10 G/15ML
15 ML SOLUTION ORAL ONCE A DAY
Status: DISCONTINUED | COMMUNITY

## 2023-05-10 RX ORDER — SPIRONOLACTONE 25 MG/1
1 TABLET TABLET, FILM COATED ORAL ONCE A DAY
Qty: 30 TABLET | Refills: 5 | Status: ACTIVE | COMMUNITY

## 2023-05-10 RX ORDER — SPIRONOLACTONE 25 MG/1
1 TABLET TABLET, FILM COATED ORAL ONCE A DAY
Qty: 30 TABLET | Refills: 5 | OUTPATIENT

## 2023-05-10 RX ORDER — PROPRANOLOL HYDROCHLORIDE 10 MG/1
10 MG TABLET ORAL ONCE A DAY
Status: ACTIVE | COMMUNITY

## 2023-05-10 RX ORDER — PROPRANOLOL HYDROCHLORIDE 10 MG/1
1 TABLET TABLET ORAL ONCE A DAY
Qty: 90 TABLET | Refills: 2 | OUTPATIENT

## 2023-05-10 NOTE — HPI-TODAY'S VISIT:
Patient was seen today for unscheduled visit for complaints of having severe nausea vomiting and diarrhea and also weight loss.  Patient is accompanied by her son reports having the symptoms started about 3 to 4 days ago.  Severe nausea vomiting not able to keep things down and diarrhea is improving.  Nausea is also improving since today.  So far she had 1-2 bowel movements.  Her current medications are all reviewed no new changes in the medications.  She lost about 4 pounds since she was last seen.  Most recent upper endoscopy revealed small varices status. However required 1 banding of the distal esophagus.  Patient also was noted to have small amount of ascites on the ultrasound.  She has been started.  When I inquired about sick contacts patient's daughter and granddaughter had stomach bug prior to her GI symptoms.  Denies any blood in the stools and denies any melanotic stools.

## 2023-05-11 LAB
A/G RATIO: 0.7
ABSOLUTE BASOPHILS: 52
ABSOLUTE EOSINOPHILS: 323
ABSOLUTE LYMPHOCYTES: 993
ABSOLUTE MONOCYTES: 482
ABSOLUTE NEUTROPHILS: 2451
ALBUMIN: 2.8
ALKALINE PHOSPHATASE: 157
ALT (SGPT): 29
AST (SGOT): 38
BASOPHILS: 1.2
BILIRUBIN, TOTAL: 2.4
BUN/CREATININE RATIO: 18
BUN: 20
CALCIUM: 9.1
CARBON DIOXIDE, TOTAL: 24
CHLORIDE: 103
CREATININE: 1.11
EGFR: 57
EOSINOPHILS: 7.5
GLOBULIN, TOTAL: 4.1
GLUCOSE: 166
HEMATOCRIT: 30.1
HEMOGLOBIN: 10.4
INR: 1.1
LYMPHOCYTES: 23.1
MCH: 31.3
MCHC: 34.6
MCV: 90.7
MONOCYTES: 11.2
MPV: 10.6
NEUTROPHILS: 57
PLATELET COUNT: 120
POTASSIUM: 4.3
PROTEIN, TOTAL: 6.9
PT: 11.3
RDW: 13.2
RED BLOOD CELL COUNT: 3.32
SODIUM: 136
WHITE BLOOD CELL COUNT: 4.3

## 2023-10-17 ENCOUNTER — TELEPHONE ENCOUNTER (OUTPATIENT)
Dept: URBAN - NONMETROPOLITAN AREA CLINIC 2 | Facility: CLINIC | Age: 63
End: 2023-10-17

## 2024-03-06 ENCOUNTER — LAB (OUTPATIENT)
Dept: URBAN - METROPOLITAN AREA CLINIC 115 | Facility: CLINIC | Age: 64
End: 2024-03-06

## 2024-03-06 ENCOUNTER — OV EP (OUTPATIENT)
Dept: URBAN - METROPOLITAN AREA CLINIC 115 | Facility: CLINIC | Age: 64
End: 2024-03-06
Payer: COMMERCIAL

## 2024-03-06 VITALS
HEIGHT: 63 IN | DIASTOLIC BLOOD PRESSURE: 72 MMHG | TEMPERATURE: 97.5 F | WEIGHT: 99.8 LBS | HEART RATE: 94 BPM | SYSTOLIC BLOOD PRESSURE: 100 MMHG | BODY MASS INDEX: 17.68 KG/M2

## 2024-03-06 DIAGNOSIS — R10.13 DYSPEPSIA: ICD-10-CM

## 2024-03-06 DIAGNOSIS — K74.60 UNSPECIFIED CIRRHOSIS OF LIVER: ICD-10-CM

## 2024-03-06 DIAGNOSIS — K80.20 CALCULUS OF GALLBLADDER WITHOUT CHOLECYSTITIS WITHOUT OBSTRUCTION: ICD-10-CM

## 2024-03-06 DIAGNOSIS — I85.11 SECONDARY ESOPHAGEAL VARICES WITH BLEEDING: ICD-10-CM

## 2024-03-06 DIAGNOSIS — Z12.11 COLON CANCER SCREENING: ICD-10-CM

## 2024-03-06 DIAGNOSIS — R18.8 OTHER ASCITES: ICD-10-CM

## 2024-03-06 PROBLEM — 305058001: Status: ACTIVE | Noted: 2024-03-06

## 2024-03-06 PROCEDURE — 99215 OFFICE O/P EST HI 40 MIN: CPT | Performed by: INTERNAL MEDICINE

## 2024-03-06 RX ORDER — TORSEMIDE 20 MG/1
AS DIRECTED TABLET ORAL
Status: ACTIVE | COMMUNITY

## 2024-03-06 RX ORDER — PANTOPRAZOLE SODIUM 40 MG/1
1 TABLET TABLET, DELAYED RELEASE ORAL ONCE A DAY
Qty: 90 TABLET | Refills: 1 | Status: ACTIVE | COMMUNITY

## 2024-03-06 RX ORDER — SPIRONOLACTONE 25 MG/1
1 TABLET TABLET, FILM COATED ORAL ONCE A DAY
Qty: 30 TABLET | Refills: 5 | Status: DISCONTINUED | COMMUNITY

## 2024-03-06 RX ORDER — CYPROHEPTADINE HYDROCHLORIDE 4 MG/1
1 TABLET TABLET ORAL TWICE A DAY
Status: ACTIVE | COMMUNITY

## 2024-03-06 RX ORDER — POTASSIUM CHLORIDE 750 MG/1
1 TABLET WITH FOOD TABLET, EXTENDED RELEASE ORAL TWICE A DAY
Status: ACTIVE | COMMUNITY

## 2024-03-06 RX ORDER — ONDANSETRON 8 MG/1
1 TABLET ON THE TONGUE AND ALLOW TO DISSOLVE AS NEEDED TABLET, ORALLY DISINTEGRATING ORAL
Qty: 20 TABLET | Refills: 0 | Status: DISCONTINUED | COMMUNITY
Start: 2023-05-10

## 2024-03-06 RX ORDER — PROPRANOLOL HYDROCHLORIDE 10 MG/1
10 MG TABLET ORAL ONCE A DAY
Status: ACTIVE | COMMUNITY

## 2024-03-06 RX ORDER — PROPRANOLOL HYDROCHLORIDE 10 MG/1
1 TABLET TABLET ORAL ONCE A DAY
Qty: 90 TABLET | Refills: 2 | Status: ACTIVE | COMMUNITY

## 2024-03-06 RX ORDER — POLYETHYLENE GLYCOL 3350 17 G/DOSE
AS DIRECTED PRIOR TO COLONSOCOPY POWDER (GRAM) ORAL ONCE A DAY
Qty: 238  GRAM | Refills: 0 | OUTPATIENT
Start: 2024-03-06 | End: 2024-03-07

## 2024-03-06 NOTE — HPI-TODAY'S VISIT:
Patient was seen today for unscheduled visit for complaints of having severe nausea vomiting and diarrhea and also weight loss.  Patient is accompanied by her son reports having the symptoms started about 3 to 4 days ago.  Severe nausea vomiting not able to keep things down and diarrhea is improving.  Nausea is also improving since today.  So far she had 1-2 bowel movements.  Her current medications are all reviewed no new changes in the medications.  She lost about 4 pounds since she was last seen.  Most recent upper endoscopy revealed small varices status. However required 1 banding of the distal esophagus.  Patient also was noted to have small amount of ascites on the ultrasound.  She has been started.  When I inquired about sick contacts patient's daughter and granddaughter had stomach bug prior to her GI symptoms.  Denies any blood in the stools and denies any melanotic stools.  3/6/24 : 63-year-old female patient was seen today for follow-up for history of cirrhosis.  Patient was last seen in the office in May 2023.  She has history of cryptogenic cirrhosis and complicated with variceal GI bleed.  Workup at that time was unremarkable except for evidence of cryptogenic cirrhosis.  Patient also had pedal edema at that time and mild ascites and she had been managed with furosemide and spironolactone.  Patient went went to [facility] she apparently had an episode of significant abdominal distention bloating and hospitalized for about 4 days.  Patient had extensive workup done was again on the workup was unremarkable as it had been here.  Patient stayed off of the hospital since then.  She has been complaining of having poor appetite abdominal fullness and distention.  She has lost more weight.  Patient accompanied by her family members reports that she has not been eating as much complains of having epigastric fullness but denies any significant abdominal pain.  Ultrasound from November was unremarkable and labs from [facility] were reviewed.  Current medications for the patient were all reviewed as well.  Patient denies any rectal bleeding.  She did undergo further endoscopy at that time and that showed small esophageal varices without any evidence of active bleeding.  Did not undergo further band ligation.  Her extensive workup from the hospital was reviewed.

## 2024-03-07 LAB
A/G RATIO: 0.4
AFP, SERUM, TUMOR MARKER: 2.8
ALBUMIN: 2.2
ALKALINE PHOSPHATASE: 127
ALT (SGPT): 54
AST (SGOT): 96
BILIRUBIN, TOTAL: 4.9
BUN/CREATININE RATIO: (no result)
BUN: 13
CALCIUM: 8.6
CARBON DIOXIDE, TOTAL: 27
CHLORIDE: 93
CREATININE: 0.75
EGFR: 90
GLOBULIN, TOTAL: 5.3
GLUCOSE: 112
HEMATOCRIT: 31.8
HEMOGLOBIN: 11.3
INR: 1.4
MCH: 32.6
MCHC: 35.5
MCV: 91.6
MPV: 9.9
PLATELET COUNT: 102
POTASSIUM: 3.3
PROTEIN, TOTAL: 7.5
PT: 14.3
RDW: 14.2
RED BLOOD CELL COUNT: 3.47
SODIUM: 127
TSH W/REFLEX TO FT4: 8.49
WHITE BLOOD CELL COUNT: 4.9

## 2024-03-12 ENCOUNTER — LAB (OUTPATIENT)
Dept: URBAN - METROPOLITAN AREA MEDICAL CENTER 31 | Facility: MEDICAL CENTER | Age: 64
End: 2024-03-12
Payer: COMMERCIAL

## 2024-03-12 DIAGNOSIS — K76.82 ENCEPHALOPATHY, HEPATIC: ICD-10-CM

## 2024-03-12 DIAGNOSIS — K74.60 CIRRHOSIS: ICD-10-CM

## 2024-03-12 DIAGNOSIS — J90 PLEURAL EFFUSION: ICD-10-CM

## 2024-03-12 PROCEDURE — G8427 DOCREV CUR MEDS BY ELIG CLIN: HCPCS | Performed by: INTERNAL MEDICINE

## 2024-03-12 PROCEDURE — 99254 IP/OBS CNSLTJ NEW/EST MOD 60: CPT | Performed by: INTERNAL MEDICINE

## 2024-03-12 PROCEDURE — 99222 1ST HOSP IP/OBS MODERATE 55: CPT | Performed by: INTERNAL MEDICINE

## 2024-03-13 ENCOUNTER — LAB (OUTPATIENT)
Dept: URBAN - METROPOLITAN AREA MEDICAL CENTER 31 | Facility: MEDICAL CENTER | Age: 64
End: 2024-03-13
Payer: COMMERCIAL

## 2024-03-13 DIAGNOSIS — K74.60 CIRRHOSIS: ICD-10-CM

## 2024-03-13 DIAGNOSIS — K76.82 ENCEPHALOPATHY, HEPATIC: ICD-10-CM

## 2024-03-13 PROCEDURE — 99232 SBSQ HOSP IP/OBS MODERATE 35: CPT | Performed by: INTERNAL MEDICINE

## 2024-03-14 ENCOUNTER — LAB (OUTPATIENT)
Dept: URBAN - METROPOLITAN AREA MEDICAL CENTER 31 | Facility: MEDICAL CENTER | Age: 64
End: 2024-03-14
Payer: COMMERCIAL

## 2024-03-14 DIAGNOSIS — K76.82 ENCEPHALOPATHY, HEPATIC: ICD-10-CM

## 2024-03-14 DIAGNOSIS — J90 PLEURAL EFFUSION ON RIGHT: ICD-10-CM

## 2024-03-14 DIAGNOSIS — K74.60 CIRRHOSIS: ICD-10-CM

## 2024-03-14 PROCEDURE — 99232 SBSQ HOSP IP/OBS MODERATE 35: CPT | Performed by: INTERNAL MEDICINE

## 2024-03-15 ENCOUNTER — LAB (OUTPATIENT)
Dept: URBAN - METROPOLITAN AREA MEDICAL CENTER 31 | Facility: MEDICAL CENTER | Age: 64
End: 2024-03-15
Payer: COMMERCIAL

## 2024-03-15 DIAGNOSIS — K74.60 CIRRHOSIS: ICD-10-CM

## 2024-03-15 DIAGNOSIS — J90 PLEURAL EFFUSION: ICD-10-CM

## 2024-03-15 DIAGNOSIS — K76.82 HEPATIC ENCEPHALOPATHY: ICD-10-CM

## 2024-03-15 PROCEDURE — 99232 SBSQ HOSP IP/OBS MODERATE 35: CPT | Performed by: INTERNAL MEDICINE

## 2024-03-16 ENCOUNTER — LAB (OUTPATIENT)
Dept: URBAN - METROPOLITAN AREA MEDICAL CENTER 31 | Facility: MEDICAL CENTER | Age: 64
End: 2024-03-16
Payer: COMMERCIAL

## 2024-03-16 DIAGNOSIS — K76.82 HE (HEPATIC ENCEPHALOPATHY): ICD-10-CM

## 2024-03-16 DIAGNOSIS — J90 PLEURAL EFFUSION ON RIGHT: ICD-10-CM

## 2024-03-16 DIAGNOSIS — K74.60 CIRRHOSIS: ICD-10-CM

## 2024-03-16 DIAGNOSIS — R18.8 ASCITES: ICD-10-CM

## 2024-03-16 PROCEDURE — 99232 SBSQ HOSP IP/OBS MODERATE 35: CPT | Performed by: INTERNAL MEDICINE

## 2024-03-21 ENCOUNTER — US (OUTPATIENT)
Dept: URBAN - METROPOLITAN AREA CLINIC 114 | Facility: CLINIC | Age: 64
End: 2024-03-21

## 2024-05-02 ENCOUNTER — OFFICE VISIT (OUTPATIENT)
Dept: URBAN - METROPOLITAN AREA SURGERY CENTER 13 | Facility: SURGERY CENTER | Age: 64
End: 2024-05-02

## 2024-05-08 ENCOUNTER — OFFICE VISIT (OUTPATIENT)
Dept: URBAN - METROPOLITAN AREA CLINIC 115 | Facility: CLINIC | Age: 64
End: 2024-05-08